# Patient Record
Sex: FEMALE | Race: WHITE | NOT HISPANIC OR LATINO | Employment: OTHER | URBAN - METROPOLITAN AREA
[De-identification: names, ages, dates, MRNs, and addresses within clinical notes are randomized per-mention and may not be internally consistent; named-entity substitution may affect disease eponyms.]

---

## 2023-11-08 ENCOUNTER — APPOINTMENT (OUTPATIENT)
Dept: ADMISSIONS | Facility: MEDICAL CENTER | Age: 73
End: 2023-11-08
Attending: SURGERY
Payer: MEDICARE

## 2023-11-16 ENCOUNTER — PRE-ADMISSION TESTING (OUTPATIENT)
Dept: ADMISSIONS | Facility: MEDICAL CENTER | Age: 73
End: 2023-11-16
Attending: SURGERY
Payer: MEDICARE

## 2023-11-16 RX ORDER — FAMOTIDINE 20 MG/1
20 TABLET, FILM COATED ORAL EVERY MORNING
COMMUNITY

## 2023-11-16 RX ORDER — UBIDECARENONE 75 MG
100 CAPSULE ORAL DAILY
COMMUNITY

## 2023-11-16 RX ORDER — ASPIRIN 81 MG/1
81 TABLET ORAL DAILY
COMMUNITY

## 2023-11-16 RX ORDER — CHLORAL HYDRATE 500 MG
1000 CAPSULE ORAL
COMMUNITY

## 2023-11-16 RX ORDER — MAGNESIUM OXIDE 400 MG/1
400 TABLET ORAL DAILY
COMMUNITY

## 2023-11-16 RX ORDER — VITAMIN E 268 MG
400 CAPSULE ORAL DAILY
COMMUNITY

## 2023-11-17 ENCOUNTER — PRE-ADMISSION TESTING (OUTPATIENT)
Dept: ADMISSIONS | Facility: MEDICAL CENTER | Age: 73
End: 2023-11-17
Attending: SURGERY
Payer: MEDICARE

## 2023-11-17 DIAGNOSIS — Z01.810 PRE-OPERATIVE CARDIOVASCULAR EXAMINATION: ICD-10-CM

## 2023-11-17 DIAGNOSIS — Z01.812 PRE-OPERATIVE LABORATORY EXAMINATION: ICD-10-CM

## 2023-11-17 LAB
ANION GAP SERPL CALC-SCNC: 8 MMOL/L (ref 7–16)
BUN SERPL-MCNC: 17 MG/DL (ref 8–22)
CALCIUM SERPL-MCNC: 10.7 MG/DL (ref 8.5–10.5)
CHLORIDE SERPL-SCNC: 100 MMOL/L (ref 96–112)
CO2 SERPL-SCNC: 25 MMOL/L (ref 20–33)
CREAT SERPL-MCNC: 0.7 MG/DL (ref 0.5–1.4)
EKG IMPRESSION: NORMAL
ERYTHROCYTE [DISTWIDTH] IN BLOOD BY AUTOMATED COUNT: 41.3 FL (ref 35.9–50)
GFR SERPLBLD CREATININE-BSD FMLA CKD-EPI: 91 ML/MIN/1.73 M 2
GLUCOSE SERPL-MCNC: 92 MG/DL (ref 65–99)
HCT VFR BLD AUTO: 44 % (ref 37–47)
HGB BLD-MCNC: 14.6 G/DL (ref 12–16)
MCH RBC QN AUTO: 30 PG (ref 27–33)
MCHC RBC AUTO-ENTMCNC: 33.2 G/DL (ref 32.2–35.5)
MCV RBC AUTO: 90.3 FL (ref 81.4–97.8)
PLATELET # BLD AUTO: 245 K/UL (ref 164–446)
PMV BLD AUTO: 11.4 FL (ref 9–12.9)
POTASSIUM SERPL-SCNC: 4.6 MMOL/L (ref 3.6–5.5)
RBC # BLD AUTO: 4.87 M/UL (ref 4.2–5.4)
SODIUM SERPL-SCNC: 133 MMOL/L (ref 135–145)
WBC # BLD AUTO: 7.3 K/UL (ref 4.8–10.8)

## 2023-11-17 PROCEDURE — 85027 COMPLETE CBC AUTOMATED: CPT

## 2023-11-17 PROCEDURE — 36415 COLL VENOUS BLD VENIPUNCTURE: CPT

## 2023-11-17 PROCEDURE — 93010 ELECTROCARDIOGRAM REPORT: CPT | Performed by: INTERNAL MEDICINE

## 2023-11-17 PROCEDURE — 93005 ELECTROCARDIOGRAM TRACING: CPT

## 2023-11-17 PROCEDURE — 80048 BASIC METABOLIC PNL TOTAL CA: CPT

## 2023-12-13 ENCOUNTER — ANESTHESIA (OUTPATIENT)
Dept: SURGERY | Facility: MEDICAL CENTER | Age: 73
End: 2023-12-13
Payer: MEDICARE

## 2023-12-13 ENCOUNTER — HOSPITAL ENCOUNTER (OUTPATIENT)
Facility: MEDICAL CENTER | Age: 73
End: 2023-12-13
Attending: SURGERY | Admitting: SURGERY
Payer: MEDICARE

## 2023-12-13 ENCOUNTER — ANESTHESIA EVENT (OUTPATIENT)
Dept: SURGERY | Facility: MEDICAL CENTER | Age: 73
End: 2023-12-13
Payer: MEDICARE

## 2023-12-13 VITALS
BODY MASS INDEX: 22.13 KG/M2 | HEART RATE: 70 BPM | OXYGEN SATURATION: 94 % | WEIGHT: 129.63 LBS | TEMPERATURE: 97.3 F | RESPIRATION RATE: 17 BRPM | HEIGHT: 64 IN | DIASTOLIC BLOOD PRESSURE: 71 MMHG | SYSTOLIC BLOOD PRESSURE: 161 MMHG

## 2023-12-13 DIAGNOSIS — G89.18 POSTOPERATIVE PAIN: ICD-10-CM

## 2023-12-13 LAB
COLLECT TME BLD: 1123 HH:MM
COLLECT TME BLD: 1134 HH:MM
COLLECT TME BLD: 1143 HH:MM
COLLECT TME BLD: 1148 HH:MM
COLLECT TME BLD: 1153 HH:MM
PATHOLOGY CONSULT NOTE: NORMAL
PTH-INTACT IO % DIF SERPL: 67 %
PTH-INTACT IO % DIF SERPL: 77 %
PTH-INTACT IO % DIF SERPL: 86 %
PTH-INTACT SP1 SERPL-MCNC: 103 PG/ML (ref 14–72)
PTH-INTACT SP2 SERPL-MCNC: 138 PG/ML
PTH-INTACT SP3 SERPL-MCNC: 45.1 PG/ML
PTH-INTACT SP4 SERPL-MCNC: 31.2 PG/ML
PTH-INTACT SP5 SERPL-MCNC: 19.9 PG/ML

## 2023-12-13 PROCEDURE — 160048 HCHG OR STATISTICAL LEVEL 1-5: Performed by: SURGERY

## 2023-12-13 PROCEDURE — A9270 NON-COVERED ITEM OR SERVICE: HCPCS

## 2023-12-13 PROCEDURE — 160029 HCHG SURGERY MINUTES - 1ST 30 MINS LEVEL 4: Performed by: SURGERY

## 2023-12-13 PROCEDURE — A9270 NON-COVERED ITEM OR SERVICE: HCPCS | Performed by: ANESTHESIOLOGY

## 2023-12-13 PROCEDURE — C1751 CATH, INF, PER/CENT/MIDLINE: HCPCS | Performed by: SURGERY

## 2023-12-13 PROCEDURE — 700102 HCHG RX REV CODE 250 W/ 637 OVERRIDE(OP): Performed by: ANESTHESIOLOGY

## 2023-12-13 PROCEDURE — 700102 HCHG RX REV CODE 250 W/ 637 OVERRIDE(OP)

## 2023-12-13 PROCEDURE — 700105 HCHG RX REV CODE 258: Performed by: SURGERY

## 2023-12-13 PROCEDURE — 160041 HCHG SURGERY MINUTES - EA ADDL 1 MIN LEVEL 4: Performed by: SURGERY

## 2023-12-13 PROCEDURE — 88331 PATH CONSLTJ SURG 1 BLK 1SPC: CPT

## 2023-12-13 PROCEDURE — 700101 HCHG RX REV CODE 250: Performed by: ANESTHESIOLOGY

## 2023-12-13 PROCEDURE — 700111 HCHG RX REV CODE 636 W/ 250 OVERRIDE (IP): Performed by: ANESTHESIOLOGY

## 2023-12-13 PROCEDURE — 160036 HCHG PACU - EA ADDL 30 MINS PHASE I: Performed by: SURGERY

## 2023-12-13 PROCEDURE — 700111 HCHG RX REV CODE 636 W/ 250 OVERRIDE (IP): Mod: JZ | Performed by: ANESTHESIOLOGY

## 2023-12-13 PROCEDURE — 83970 ASSAY OF PARATHORMONE: CPT | Mod: 91

## 2023-12-13 PROCEDURE — 160009 HCHG ANES TIME/MIN: Performed by: SURGERY

## 2023-12-13 PROCEDURE — 160002 HCHG RECOVERY MINUTES (STAT): Performed by: SURGERY

## 2023-12-13 PROCEDURE — 160035 HCHG PACU - 1ST 60 MINS PHASE I: Performed by: SURGERY

## 2023-12-13 PROCEDURE — 160046 HCHG PACU - 1ST 60 MINS PHASE II: Performed by: SURGERY

## 2023-12-13 PROCEDURE — 160025 RECOVERY II MINUTES (STATS): Performed by: SURGERY

## 2023-12-13 PROCEDURE — 88305 TISSUE EXAM BY PATHOLOGIST: CPT

## 2023-12-13 RX ORDER — HYDROMORPHONE HYDROCHLORIDE 1 MG/ML
0.2 INJECTION, SOLUTION INTRAMUSCULAR; INTRAVENOUS; SUBCUTANEOUS
Status: DISCONTINUED | OUTPATIENT
Start: 2023-12-13 | End: 2023-12-13 | Stop reason: HOSPADM

## 2023-12-13 RX ORDER — OXYCODONE HCL 5 MG/5 ML
10 SOLUTION, ORAL ORAL
Status: COMPLETED | OUTPATIENT
Start: 2023-12-13 | End: 2023-12-13

## 2023-12-13 RX ORDER — HYDROCODONE BITARTRATE AND ACETAMINOPHEN 5; 325 MG/1; MG/1
1-2 TABLET ORAL EVERY 6 HOURS PRN
Qty: 30 TABLET | Refills: 0 | Status: SHIPPED | OUTPATIENT
Start: 2023-12-13 | End: 2023-12-20

## 2023-12-13 RX ORDER — ONDANSETRON 2 MG/ML
4 INJECTION INTRAMUSCULAR; INTRAVENOUS
Status: DISCONTINUED | OUTPATIENT
Start: 2023-12-13 | End: 2023-12-13 | Stop reason: HOSPADM

## 2023-12-13 RX ORDER — SODIUM CHLORIDE, SODIUM LACTATE, POTASSIUM CHLORIDE, CALCIUM CHLORIDE 600; 310; 30; 20 MG/100ML; MG/100ML; MG/100ML; MG/100ML
INJECTION, SOLUTION INTRAVENOUS CONTINUOUS
Status: DISCONTINUED | OUTPATIENT
Start: 2023-12-13 | End: 2023-12-13 | Stop reason: HOSPADM

## 2023-12-13 RX ORDER — DEXAMETHASONE SODIUM PHOSPHATE 4 MG/ML
INJECTION, SOLUTION INTRA-ARTICULAR; INTRALESIONAL; INTRAMUSCULAR; INTRAVENOUS; SOFT TISSUE PRN
Status: DISCONTINUED | OUTPATIENT
Start: 2023-12-13 | End: 2023-12-13 | Stop reason: SURG

## 2023-12-13 RX ORDER — ACETAMINOPHEN 325 MG/1
650 TABLET ORAL ONCE
Status: DISCONTINUED | OUTPATIENT
Start: 2023-12-13 | End: 2023-12-13 | Stop reason: HOSPADM

## 2023-12-13 RX ORDER — LABETALOL HYDROCHLORIDE 5 MG/ML
5 INJECTION, SOLUTION INTRAVENOUS
Status: DISCONTINUED | OUTPATIENT
Start: 2023-12-13 | End: 2023-12-13 | Stop reason: HOSPADM

## 2023-12-13 RX ORDER — ACETAMINOPHEN 325 MG/1
TABLET ORAL
Status: COMPLETED
Start: 2023-12-13 | End: 2023-12-13

## 2023-12-13 RX ORDER — EPHEDRINE SULFATE 50 MG/ML
5 INJECTION, SOLUTION INTRAVENOUS
Status: DISCONTINUED | OUTPATIENT
Start: 2023-12-13 | End: 2023-12-13 | Stop reason: HOSPADM

## 2023-12-13 RX ORDER — ONDANSETRON 2 MG/ML
INJECTION INTRAMUSCULAR; INTRAVENOUS PRN
Status: DISCONTINUED | OUTPATIENT
Start: 2023-12-13 | End: 2023-12-13 | Stop reason: SURG

## 2023-12-13 RX ORDER — EPHEDRINE SULFATE 50 MG/ML
INJECTION, SOLUTION INTRAVENOUS PRN
Status: DISCONTINUED | OUTPATIENT
Start: 2023-12-13 | End: 2023-12-13 | Stop reason: SURG

## 2023-12-13 RX ORDER — REMIFENTANIL HYDROCHLORIDE 1 MG/ML
INJECTION, POWDER, LYOPHILIZED, FOR SOLUTION INTRAVENOUS
Status: DISCONTINUED | OUTPATIENT
Start: 2023-12-13 | End: 2023-12-13 | Stop reason: SURG

## 2023-12-13 RX ORDER — DIPHENHYDRAMINE HYDROCHLORIDE 50 MG/ML
12.5 INJECTION INTRAMUSCULAR; INTRAVENOUS
Status: DISCONTINUED | OUTPATIENT
Start: 2023-12-13 | End: 2023-12-13 | Stop reason: HOSPADM

## 2023-12-13 RX ORDER — CALCIUM CARBONATE 1000 MG/1
2000 TABLET, CHEWABLE ORAL EVERY MORNING
Qty: 90 TABLET | Refills: 3 | Status: SHIPPED | OUTPATIENT
Start: 2023-12-13

## 2023-12-13 RX ORDER — OXYCODONE HCL 5 MG/5 ML
5 SOLUTION, ORAL ORAL
Status: COMPLETED | OUTPATIENT
Start: 2023-12-13 | End: 2023-12-13

## 2023-12-13 RX ORDER — HYDRALAZINE HYDROCHLORIDE 20 MG/ML
5 INJECTION INTRAMUSCULAR; INTRAVENOUS
Status: DISCONTINUED | OUTPATIENT
Start: 2023-12-13 | End: 2023-12-13 | Stop reason: HOSPADM

## 2023-12-13 RX ORDER — HYDROMORPHONE HYDROCHLORIDE 1 MG/ML
0.1 INJECTION, SOLUTION INTRAMUSCULAR; INTRAVENOUS; SUBCUTANEOUS
Status: DISCONTINUED | OUTPATIENT
Start: 2023-12-13 | End: 2023-12-13 | Stop reason: HOSPADM

## 2023-12-13 RX ORDER — HALOPERIDOL 5 MG/ML
1 INJECTION INTRAMUSCULAR
Status: DISCONTINUED | OUTPATIENT
Start: 2023-12-13 | End: 2023-12-13 | Stop reason: HOSPADM

## 2023-12-13 RX ORDER — SODIUM CHLORIDE, SODIUM LACTATE, POTASSIUM CHLORIDE, CALCIUM CHLORIDE 600; 310; 30; 20 MG/100ML; MG/100ML; MG/100ML; MG/100ML
INJECTION, SOLUTION INTRAVENOUS CONTINUOUS
Status: ACTIVE | OUTPATIENT
Start: 2023-12-13 | End: 2023-12-13

## 2023-12-13 RX ORDER — HYDROMORPHONE HYDROCHLORIDE 1 MG/ML
0.4 INJECTION, SOLUTION INTRAMUSCULAR; INTRAVENOUS; SUBCUTANEOUS
Status: DISCONTINUED | OUTPATIENT
Start: 2023-12-13 | End: 2023-12-13 | Stop reason: HOSPADM

## 2023-12-13 RX ORDER — LIDOCAINE HYDROCHLORIDE 20 MG/ML
INJECTION, SOLUTION EPIDURAL; INFILTRATION; INTRACAUDAL; PERINEURAL PRN
Status: DISCONTINUED | OUTPATIENT
Start: 2023-12-13 | End: 2023-12-14 | Stop reason: HOSPADM

## 2023-12-13 RX ADMIN — ACETAMINOPHEN 650 MG: 325 TABLET, FILM COATED ORAL at 12:39

## 2023-12-13 RX ADMIN — EPHEDRINE SULFATE 5 MG: 50 INJECTION, SOLUTION INTRAVENOUS at 11:45

## 2023-12-13 RX ADMIN — LABETALOL HYDROCHLORIDE 5 MG: 5 INJECTION, SOLUTION INTRAVENOUS at 12:42

## 2023-12-13 RX ADMIN — PROPOFOL 50 MG: 10 INJECTION, EMULSION INTRAVENOUS at 12:09

## 2023-12-13 RX ADMIN — DEXAMETHASONE SODIUM PHOSPHATE 8 MG: 4 INJECTION INTRA-ARTICULAR; INTRALESIONAL; INTRAMUSCULAR; INTRAVENOUS; SOFT TISSUE at 11:28

## 2023-12-13 RX ADMIN — ONDANSETRON 4 MG: 2 INJECTION INTRAMUSCULAR; INTRAVENOUS at 11:46

## 2023-12-13 RX ADMIN — PROPOFOL 150 MG: 10 INJECTION, EMULSION INTRAVENOUS at 11:12

## 2023-12-13 RX ADMIN — FENTANYL CITRATE 50 MCG: 50 INJECTION, SOLUTION INTRAMUSCULAR; INTRAVENOUS at 12:12

## 2023-12-13 RX ADMIN — SODIUM CHLORIDE, POTASSIUM CHLORIDE, SODIUM LACTATE AND CALCIUM CHLORIDE: 600; 310; 30; 20 INJECTION, SOLUTION INTRAVENOUS at 11:08

## 2023-12-13 RX ADMIN — Medication 80 MG: at 11:12

## 2023-12-13 RX ADMIN — ACETAMINOPHEN 650 MG: 325 TABLET ORAL at 12:39

## 2023-12-13 RX ADMIN — HYDRALAZINE HYDROCHLORIDE 5 MG: 20 INJECTION, SOLUTION INTRAMUSCULAR; INTRAVENOUS at 13:44

## 2023-12-13 RX ADMIN — FENTANYL CITRATE 50 MCG: 50 INJECTION, SOLUTION INTRAMUSCULAR; INTRAVENOUS at 11:10

## 2023-12-13 RX ADMIN — FENTANYL CITRATE 25 MCG: 50 INJECTION, SOLUTION INTRAMUSCULAR; INTRAVENOUS at 13:00

## 2023-12-13 RX ADMIN — EPHEDRINE SULFATE 10 MG: 50 INJECTION, SOLUTION INTRAVENOUS at 11:26

## 2023-12-13 RX ADMIN — FENTANYL CITRATE 25 MCG: 50 INJECTION, SOLUTION INTRAMUSCULAR; INTRAVENOUS at 13:07

## 2023-12-13 RX ADMIN — LIDOCAINE HYDROCHLORIDE 100 MG: 20 INJECTION, SOLUTION EPIDURAL; INFILTRATION; INTRACAUDAL at 11:12

## 2023-12-13 RX ADMIN — OXYCODONE HYDROCHLORIDE 5 MG: 5 SOLUTION ORAL at 12:40

## 2023-12-13 RX ADMIN — REMIFENTANIL HYDROCHLORIDE 0.1 MCG/KG/MIN: 1 INJECTION, POWDER, LYOPHILIZED, FOR SOLUTION INTRAVENOUS at 11:16

## 2023-12-13 RX ADMIN — HYDRALAZINE HYDROCHLORIDE 5 MG: 20 INJECTION, SOLUTION INTRAMUSCULAR; INTRAVENOUS at 13:31

## 2023-12-13 ASSESSMENT — PAIN DESCRIPTION - PAIN TYPE
TYPE: SURGICAL PAIN

## 2023-12-13 NOTE — ANESTHESIA PROCEDURE NOTES
Airway    Date/Time: 12/13/2023 11:14 AM    Performed by: Shimon Nuñez M.D.  Authorized by: Shimon Nuñez M.D.    Location:  OR  Urgency:  Elective  Difficult Airway: No    Indications for Airway Management:  Anesthesia      Spontaneous Ventilation: absent    Sedation Level:  Deep  Preoxygenated: Yes    Patient Position:  Sniffing  Mask Difficulty Assessment:  1 - vent by mask  Final Airway Type:  Endotracheal airway  Final Endotracheal Airway:  ETT and NIM tube  Cuffed: Yes    Technique Used for Successful ETT Placement:  Direct laryngoscopy    Insertion Site:  Oral  Blade Type:  Oh  Laryngoscope Blade/Videolaryngoscope Blade Size:  3  ETT Size (mm):  7.0  Measured from:  Teeth  ETT to Teeth (cm):  22  Placement Verified by: auscultation and capnometry    Cormack-Lehane Classification:  Grade IIa - partial view of glottis  Number of Attempts at Approach:  1

## 2023-12-13 NOTE — DISCHARGE INSTRUCTIONS
What to Expect Post Anesthesia    Rest and take it easy for the first 24 hours.  A responsible adult is recommended to remain with you during that time.  It is normal to feel sleepy.  We encourage you to not do anything that requires balance, judgment or coordination.    FOR 24 HOURS DO NOT:  Drive, operate machinery or run household appliances.  Drink beer or alcoholic beverages.  Make important decisions or sign legal documents.    To avoid nausea, slowly advance diet as tolerated, avoiding spicy or greasy foods for the first day.  Add more substantial food to your diet according to your provider's instructions.  INCREASE FLUIDS AND FIBER TO AVOID CONSTIPATION.    MILD FLU-LIKE SYMPTOMS ARE NORMAL.  YOU MAY EXPERIENCE GENERALIZED MUSCLE ACHES, THROAT IRRITATION, HEADACHE AND/OR SOME NAUSEA.    If any questions arise, call your provider.   If your provider is not available, please feel free to call the Surgical Center at [phone].    MEDICATIONS: Resume taking daily medication.  Take prescribed pain medication with food.  If no medication is prescribed, you may take non-aspirin pain medication if needed.  PAIN MEDICATION CAN BE VERY CONSTIPATING.  Take a stool softener or laxative such as senokot, pericolace, or milk of magnesia if needed.    Last pain medication given at 1pm (oral oxycodone given). Ok to take your prescription pain medication if needed after 7pm    Tylenol given at 1pm.    Ok to take ibuprofen/Motrin if needed at any time.      Post-Operative Discharge Instructions for Parathyroidectomy      ACTIVITY:    Most patients are able to return to a full-time work schedule in 1-2 weeks; however this may vary according to your job. It may take longer to return to heavy physical or other demanding work, or shorter if you are feeling well.      Do NOT drive a car until you are able to turn the neck side to side, which may take 1-2 weeks.       Do NOT drive while you are taking pain medicines.       DIET:    You may have temporary throat discomfort or difficulty swallowing. This is due to the surgery around your larynx (voice box) and esophagus (swallowing tube). These symptoms will gradually improve over the course of several weeks.       Drink and eat foods that can be swallowed easily, e.g. juice, soup, gelatin, applesauce, scrambled eggs or mashed potatoes.       You may be able to return to your usual diet in a couple of days.      INCISION CARE:    You may remove the outer dressing 48 hours after surgery.      You may shower 48 hours after surgery but please do not swim or soak in a tub for at least 2 weeks. After you finish showering, just pat your incision dry. If it is draining clear fluid, you can cover it with a dry dressing (such as gauze). Do NOT scrub with soap or washcloth for the first 10 days.       Mild swelling at the incision site will go away in 4-6 weeks. The pink line will slowly fade to white during the next 6-12 months.       Use a sunscreen (SPF#30 or higher) or wear a scarf for protection if in the sun for the first 6 months to a year as the sun can darken your scar.       You may begin to use a hypoallergenic moisturizing cream (no vitamin E, Mederma, or other “scar” creams) along the incision after 2 weeks.      COMMON PROBLEMS:    Numbness of the skin under the chin or above the incision is normal and should go away in a few weeks.       You may feel a lump or pressure in your throat sensation when swallowing for a few weeks.       Your incision may feel itchy while it heals. Avoid rubbing or scratching if possible.       You may feel neck stiffness, tightness, a pulling feeling, mild aching chest discomfort, headache, ear pain or congestion. Take a mild pain medicine such as Tylenol or Advil. Put heat on the area using a hot water bottle, heating pad or warm shower.       Some people prefer to sleep with an extra pillow for the first few days after the surgery, this helps keep  swelling around your incision to a minimum.       Your voice may be hoarse or weak. Pitch or tone may change. You may have difficulty singing. This usually goes back to normal over 6 weeks to 6 months.       After surgery, you may notice a change in your mood, emotional ups and downs, depression, irritability or fatigue and weakness. These changes will get better as time passes.       You do not need to be at bed rest, being active is normally well tolerated within reason.      MEDICATIONS:    Take 2000 mg of TUMS over-the-counter after your surgery (two tablets). If you begin experiencing symptoms of low calcium such as numbness or tingling in your fingertips or around your mouth, you may increase the amount of TUMS you take up to 10 tablets per day. If symptoms persist on 10 TUMS a day, call my office to inform me.      Please note that it is common for the calcium level to be low following removal of the parathyroid, and you may experience numbness or tingling, which is a sign of low calcium. If this happens, it should improve when you take your calcium supplements. If it does not, please call your doctor.       Please resume your pre-hospital medications. You should follow-up with your primary care physician regarding new prescriptions and refills.       We will supply you with a prescription for a mild narcotic pain medication. You are not required to take it. If you do take it, please do not drive or drink alcohol as these in combination may make you drowsy. Most patients do not need strong pain medicine by the time you leave the hospital. You can take Tylenol (acetaminophen) or ibuprofen (e.g. Advil) as needed.       If you are taking supplemental calcium or narcotics, you may also want to take a stool softener, such as over-the-counter Colace or Senna, to avoid constipation. Stay hydrated by drinking some extra water.      LABORATORY DRAW:  You have been provided with a prescription to have you calcium  levels drawn prior to your follow up visit. The order is at the laboratory that you designated at as your lab of choice at the office. Please have this drawn ~2 days prior to your follow-up visit.     CALL YOUR DOCTOR IF:    Call your doctor or go to the Emergency Room if you have fever (temperature greater than 100.5), chills, lightheadedness, shortness of breath, difficulty breathing, nausea, vomiting, numbness or tingling in your fingers, hands, or mouth, muscle spasms, or if you notice signs of wound infection (redness, tenderness, or drainage from the incision). Please also call or go to the Emergency Room if you have any other urgent concerns.      FOLLOW-UP:   With Dr. Sanchez in one week, call to schedule, [phone].     Eleni Sanchez M.D.  [facility]     [address] [place] [address] [id number]  [place] [id number]

## 2023-12-13 NOTE — ANESTHESIA PREPROCEDURE EVALUATION
Case: [id number] Anesthesia Start Date/Time: 12/13/23 1108    Procedure: MINIMALLY INVASIVE PARATHYROID EXPLORATION WITH INTRAOPERATIVE PARA THYROID HORMONE MONITORING    Pre-op diagnosis: PRIMARY HYPERPARATHYROIDISM    Location: [facility] ROOM 23 / SURGERY SAME DAY [facility]    Surgeons: Eleni Sanchez M.D.          CAD, h/o MI and stents x 2, denies any CP/SOB, >4 METS    Relevant Problems   No relevant active problems       Physical Exam    Airway   Mallampati: II  TM distance: >3 FB  Neck ROM: full       Cardiovascular - normal exam  Rhythm: regular  Rate: normal  (-) murmur     Dental - normal exam           Pulmonary - normal exam  Breath sounds clear to auscultation     Abdominal    Neurological - normal exam                   Anesthesia Plan    ASA 3   ASA physical status 3 criteria: CAD/stents (> 3 months)    Plan - general       Airway plan will be ETT          Induction: intravenous    Postoperative Plan: Postoperative administration of opioids is intended.    Pertinent diagnostic labs and testing reviewed    Informed Consent:    Anesthetic plan and risks discussed with patient.    Use of blood products discussed with: patient whom consented to blood products.

## 2023-12-13 NOTE — OP REPORT
Operative Report     Date: 12/13/2023    Surgeon: Eleni Sanchez M.D.     Assistant: Heron Baumgarten M.D.    My assistant was medically necessary for this procedure. She placed the precordial electrodes were placed by the surgical assistant, who then monitored the recurrent laryngeal nerve throughout the procedure, as well as retracted the tissues to aid in my visualization of the parathyroid tissue, and assisted with wound closure.    Anesthesiologist: Joaquin SR    Pre-operative Diagnosis: E 21.0 primary hyperparathyroidism     Post-operative Diagnosis: same, right inferior parathyroid adenoma     Procedure:   1. right minimally invasive parathyroid exploration with intraoperative PTH monitoring (55247 Parathyroidectomy or exploration of parathyroid)  2. unilateral recurrent laryngeal nerve monitoring     Findings: Enlarged right inferior parathyroid adenoma. Intraoperative PTH monitoring was performed with levels drawn at appropriate intervals. There was a 86% drop from the highest level, with the final level being 19 picograms/deciliter.     Procedure in detail: The patient was identified in the pre-operative holding area and brought to the operating room. Correct side and site were identified. GETA was smoothly induced. The patient was prepped and draped in the usual sterile fashion.     With the patient in the supine position, the head of the bed slightly elevated, the neck slightly extended, and the patient intubated with a NIM endotracheal tube, the precordial electrodes were placed by the surgical assistant, who then monitored the recurrent laryngeal nerve throughout the procedure.     The neck was prepared with betadiene and draped in the usual fashion. The neck was entered through a short lower transverse cervical incision and carried down through the platysma. Subplatysmal flaps were dissected superiorly and inferiorly down to the sternal notch. The midline cervical fascia was incised down to the  capsule of the thyroid.     The strap muscles were mobilized off the right thyroid lobe, and with careful dissection we identified an enlarged appearing right inferior gland. The vascular pedicles were divided with the Ligasure device. The gland was sent for immediate pathologic exam, which revealed a hypercellular enlarged parathyroid gland.     Intraoperative PTH monitoring was performed with levels drawn at appropriate intervals. There was a 86% drop from the highest level, with the final level being 19 picograms/deciliter.     The integrity of the right laryngeal nerve was documented. Fibrillar was placed in the right side of the neck. The midline cervical fascia was reapproximated with running 3-0 Vicryl. Platysma was reapproximated with interrupted 3-0 Vicryl. The skin was closed with running monocryl.     The patient was awakened from general anesthetic, and was taken to the recovery room in stable condition.     Sponge and needle counts were correct at the end of the case.     Specimen: right inferior parathyroid adenoma     EBL: minimal     Dispo: stable, extubated, to PACU     Eleni Sanchez M.D.   [facility]   [phone]

## 2023-12-13 NOTE — PROGRESS NOTES
Post-Operative Discharge Instructions for Parathyroidectomy     ACTIVITY:    Most patients are able to return to a full-time work schedule in 1-2 weeks; however this may vary according to your job. It may take longer to return to heavy physical or other demanding work, or shorter if you are feeling well.     Do NOT drive a car until you are able to turn the neck side to side, which may take 1-2 weeks.      Do NOT drive while you are taking pain medicines.     DIET:    You may have temporary throat discomfort or difficulty swallowing. This is due to the surgery around your larynx (voice box) and esophagus (swallowing tube). These symptoms will gradually improve over the course of several weeks.      Drink and eat foods that can be swallowed easily, e.g. juice, soup, gelatin, applesauce, scrambled eggs or mashed potatoes.      You may be able to return to your usual diet in a couple of days.     INCISION CARE:    You may remove the outer dressing 48 hours after surgery.     You may shower 48 hours after surgery but please do not swim or soak in a tub for at least 2 weeks. After you finish showering, just pat your incision dry. If it is draining clear fluid, you can cover it with a dry dressing (such as gauze). Do NOT scrub with soap or washcloth for the first 10 days.      Mild swelling at the incision site will go away in 4-6 weeks. The pink line will slowly fade to white during the next 6-12 months.      Use a sunscreen (SPF#30 or higher) or wear a scarf for protection if in the sun for the first 6 months to a year as the sun can darken your scar.      You may begin to use a hypoallergenic moisturizing cream (no vitamin E, Mederma, or other “scar” creams) along the incision after 2 weeks.     COMMON PROBLEMS:    Numbness of the skin under the chin or above the incision is normal and should go away in a few weeks.      You may feel a lump or pressure in your throat sensation when swallowing for a few weeks.      Your  incision may feel itchy while it heals. Avoid rubbing or scratching if possible.      You may feel neck stiffness, tightness, a pulling feeling, mild aching chest discomfort, headache, ear pain or congestion. Take a mild pain medicine such as Tylenol or Advil. Put heat on the area using a hot water bottle, heating pad or warm shower.      Some people prefer to sleep with an extra pillow for the first few days after the surgery, this helps keep swelling around your incision to a minimum.      Your voice may be hoarse or weak. Pitch or tone may change. You may have difficulty singing. This usually goes back to normal over 6 weeks to 6 months.      After surgery, you may notice a change in your mood, emotional ups and downs, depression, irritability or fatigue and weakness. These changes will get better as time passes.      You do not need to be at bed rest, being active is normally well tolerated within reason.     MEDICATIONS:    Take 2000 mg of TUMS over-the-counter after your surgery (two tablets). If you begin experiencing symptoms of low calcium such as numbness or tingling in your fingertips or around your mouth, you may increase the amount of TUMS you take up to 10 tablets per day. If symptoms persist on 10 TUMS a day, call my office to inform me.     Please note that it is common for the calcium level to be low following removal of the parathyroid, and you may experience numbness or tingling, which is a sign of low calcium. If this happens, it should improve when you take your calcium supplements. If it does not, please call your doctor.      Please resume your pre-hospital medications. You should follow-up with your primary care physician regarding new prescriptions and refills.      We will supply you with a prescription for a mild narcotic pain medication. You are not required to take it. If you do take it, please do not drive or drink alcohol as these in combination may make you drowsy. Most patients do  not need strong pain medicine by the time you leave the hospital. You can take Tylenol (acetaminophen) or ibuprofen (e.g. Advil) as needed.      If you are taking supplemental calcium or narcotics, you may also want to take a stool softener, such as over-the-counter Colace or Senna, to avoid constipation. Stay hydrated by drinking some extra water.     LABORATORY DRAW:  You have been provided with a prescription to have you calcium levels drawn prior to your follow up visit. The order is at the laboratory that you designated at as your lab of choice at the office. Please have this drawn ~2 days prior to your follow-up visit.    CALL YOUR DOCTOR IF:    Call your doctor or go to the Emergency Room if you have fever (temperature greater than 100.5), chills, lightheadedness, shortness of breath, difficulty breathing, nausea, vomiting, numbness or tingling in your fingers, hands, or mouth, muscle spasms, or if you notice signs of wound infection (redness, tenderness, or drainage from the incision). Please also call or go to the Emergency Room if you have any other urgent concerns.     FOLLOW-UP:   With Dr. Sanchez in one week, call to schedule, [phone].    Eleni Sanchez M.D.  [facility]    [address]  [place] [address]

## 2023-12-13 NOTE — OR NURSING
1215- Pt to PACU from OR. Report from anesthesia and OR RN. On 6L O2 via mask. Respirations even and unlabored. VSS. Dressing to anterior neck CDI. Soft to palpation    1235- Pt tolerating sips of water    1240- Pt medicated per MAR for pain and elevated BP    1300- Pt medicated per MAR for pain    1307- PT medicated per MAR for pain    1331- PT medicated per MAR for elevated BP    1344- Pt medicated for elevated BP    1400- Phase II criteria met    1420- Discharge instructions discussed with pt's . All questions answered. Verbalized understanding.    1430- Art line removed per protocol. Pt ambulated to bathroom. Able to void without difficulty. Dressed independently.    1445- BP WDL. PIV removed with tip intact. Pt escorted out of department in wheelchair with all belongings. Discharged home to responsible adult.

## 2023-12-13 NOTE — H&P
"Surgical History and Physical    Date: 2023    PCP: Domonique Harden D.O.  Attending Physician: Eleni Sanchez M.D. [facility]    CC: \"here for surgery\"    HPI: This is a 73 y.o. female who is presenting with primary hyperparathyroidism, here for surgery.     Past Medical History:   Diagnosis Date    Anesthesia     Intraoperative Awareness, and PONV    Awareness under anesthesia     Cancer (HCC)     breast ca    Cataract     mild    Heart burn     High cholesterol     Hypertension     Myocardial infarct (HCC)     PONV (postoperative nausea and vomiting)        Past Surgical History:   Procedure Laterality Date    BREAST RECONSTRUCTION Bilateral     implants    CARDIAC CATH, RIGHT/LEFT HEART      with stent x2    COLON RESECTION      FOREIGN BODY REMOVAL      R foot    LITHOTRIPSY Right     LYMPHADENECTOMY Left     underarm    MASTECTOMY Bilateral     RECTOVAGINAL FISTULA REPAIR         Current Facility-Administered Medications   Medication Dose Route Frequency Provider Last Rate Last Admin    lactated ringers infusion   Intravenous Continuous Eleni Sanchez M.D.        acetaminophen (Tylenol) tablet 650 mg  650 mg Oral Once Shimon Nuñez M.D.           Social History     Socioeconomic History    Marital status:      Spouse name: Not on file    Number of children: Not on file    Years of education: Not on file    Highest education level: Not on file   Occupational History    Not on file   Tobacco Use    Smoking status: Former     Current packs/day: 0.00     Types: Cigarettes     Quit date: 1973     Years since quittin.1    Smokeless tobacco: Never   Vaping Use    Vaping Use: Never used   Substance and Sexual Activity    Alcohol use: Yes     Alcohol/week: 1.2 oz     Types: 2 Cans of beer per week     Comment: 4x/week    Drug use: Never    Sexual activity: Not on file   Other Topics Concern    Not on file   Social History Narrative    Not on file     Social Determinants of " "Health     Financial Resource Strain: Not on file   Food Insecurity: Not on file   Transportation Needs: Not on file   Physical Activity: Not on file   Stress: Not on file   Social Connections: Not on file   Intimate Partner Violence: Not on file   Housing Stability: Not on file       History reviewed. No pertinent family history.    Allergies:  Morphine    Review of Systems:  Constitutional: Negative for fever, chills, weight loss, malaise/fatigue and diaphoresis.   HENT: Negative for hearing loss, ear pain, nosebleeds, congestion, sore throat, neck pain, tinnitus and ear discharge.    Eyes: Negative for blurred vision, double vision, photophobia, pain, discharge and redness.   Respiratory: Negative for cough, hemoptysis, sputum production, shortness of breath, wheezing and stridor.    Cardiovascular: Negative for chest pain, palpitations, orthopnea, claudication, leg swelling and PND.   Gastrointestinal: Negative for heartburn, nausea, vomiting, abdominal pain, diarrhea, constipation, blood in stool and melena.   Genitourinary: Negative for dysuria, urgency, frequency, hematuria and flank pain.   Musculoskeletal: Negative for myalgias, back pain, joint pain and falls.   Skin: Negative for itching and rash.  Neurological: Negative for dizziness, tingling, tremors, sensory change, speech change, focal weakness, seizures, loss of consciousness, weakness and headaches.   Endo/Heme/Allergies: Negative for environmental allergies and polydipsia. Does not bruise/bleed easily.   Psychiatric/Behavioral: Negative for depression, suicidal ideas, hallucinations, memory loss and substance abuse. The patient is not nervous/anxious and does not have insomnia.    Physical Exam:  BP (!) 158/68   Pulse 60   Temp 36.6 °C (97.8 °F) (Temporal)   Resp 17   Ht 1.626 m (5' 4\")   Wt 58.8 kg (129 lb 10.1 oz)   SpO2 96%     Constitutional: she is oriented to person, place, and time.  she appears well-developed and well-nourished. No " distress.   Head: Normocephalic and atraumatic.   Neck: Normal range of motion. Neck supple. No JVD present. No tracheal deviation present. No thyromegaly present.   Cardiovascular: Normal rate, regular rhythm, normal heart sounds and intact distal pulses.  Exam reveals no gallop and no friction rub.  No murmur heard.  Pulmonary/Chest: Effort normal and breath sounds normal. No stridor. No respiratory distress. she has no wheezes. she has no rales.   Abdominal: Soft. Bowel sounds are normal. she exhibits no distension and no mass. There is no tenderness. There is no rebound and no guarding.   Musculoskeletal: Normal range of motion. she exhibits no edema and no tenderness.   Neurological: she is alert and oriented to person, place, and time. she has normal reflexes. No cranial nerve deficit. Coordination normal.   Skin: Skin is warm and dry. No rash noted. she is not diaphoretic. No erythema. No pallor.   Psychiatric: she has a normal mood and affect.  Behavior is normal.         Assessment: This is a 73 y.o. female with primary hyperparathyroidism.    Plan: Parathyroidectomy.     Eleni Sanchez M.D.  [facility]  [phone]

## 2023-12-13 NOTE — ANESTHESIA TIME REPORT
Anesthesia Start and Stop Event Times       Date Time Event    12/13/2023 1100 Ready for Procedure     1108 Anesthesia Start     1217 Anesthesia Stop          Responsible Staff  12/13/23      Name Role Begin End    Shimon Nuñez M.D. Anesth 1108 1217          Overtime Reason:  no overtime (within assigned shift)    Comments:

## 2023-12-13 NOTE — ANESTHESIA PROCEDURE NOTES
Arterial Line    Performed by: Shimon Nuñez M.D.  Authorized by: Shimon Nuñez M.D.    Start Time:  12/13/2023 11:20 AM  End Time:  12/13/2023 11:23 AM  Localization: surface landmarks    Patient Location:  OR  Indication: continuous blood pressure monitoring and blood sampling needed        Catheter Size:  20 G  Seldinger Technique?: Yes    Laterality:  Right  Site:  Radial artery  Line Secured:  Antimicrobial disc, tape and transparent dressing  Events: patient tolerated procedure well with no complications

## 2023-12-14 NOTE — ANESTHESIA POSTPROCEDURE EVALUATION
Patient: Larissa Lopez    Procedure Summary       Date: 12/13/23 Room / Location: [facility] ROOM 23 / SURGERY SAME DAY [facility]    Anesthesia Start: 1108 Anesthesia Stop: 1217    Procedure: MINIMALLY INVASIVE PARATHYROID EXPLORATION WITH INTRAOPERATIVE PARA THYROID HORMONE MONITORING (Neck) Diagnosis: (PRIMARY HYPERPARATHYROIDISM)    Surgeons: Eleni Sanchez M.D. Responsible Provider: Shimon Nuñez M.D.    Anesthesia Type: general ASA Status: 3            Final Anesthesia Type: general  Last vitals  BP   Blood Pressure : (!) 161/71    Temp   36.3 °C (97.3 °F)    Pulse   70   Resp   17    SpO2   94 %      Anesthesia Post Evaluation    Patient location during evaluation: PACU  Patient participation: complete - patient participated  Level of consciousness: awake and alert    Airway patency: patent  Anesthetic complications: no  Cardiovascular status: hemodynamically stable  Respiratory status: acceptable  Hydration status: euvolemic    PONV: none          There were no known notable events for this encounter.     Nurse Pain Score: 1 (NPRS)

## 2024-09-24 ENCOUNTER — TELEPHONE (OUTPATIENT)
Dept: SURGICAL ONCOLOGY | Facility: MEDICAL CENTER | Age: 74
End: 2024-09-24
Payer: MEDICARE

## 2024-09-24 NOTE — TELEPHONE ENCOUNTER
Patient is requesting you order labs before her appointment, could you put those in or does she have to see you first?    I will send to [facility] in [place] on [address].

## (undated) DEVICE — CANISTER SUCTION 3000ML MECHANICAL FILTER AUTO SHUTOFF MEDI-VAC NONSTERILE LF DISP  (40EA/CA)

## (undated) DEVICE — KIT  I.V. START (100EA/CA)

## (undated) DEVICE — BOVIE NEEDLE TIP 3CM COLORADO

## (undated) DEVICE — SLEEVE VASO CALF MED - (10PR/CA)

## (undated) DEVICE — KIT RADIAL ARTERY 20GA W/MAX BARRIER AND BIOPATCH  (5EA/CA) #10740 IS FOR THE SET RADIAL ARTERIAL

## (undated) DEVICE — GOWN SURGEONS LARGE - (32/CA)

## (undated) DEVICE — SODIUM CHL. INJ. 0.9% 500ML (24EA/CA 50CA/PF)

## (undated) DEVICE — MASK OXYGEN VNYL ADLT MED CONC WITH 7 FOOT TUBING  - (50EA/CA)

## (undated) DEVICE — CANISTER SUCTION RIGID RED 1500CC (40EA/CA)

## (undated) DEVICE — SPONGE GAUZE STER 4X4 8-PL - (2/PK 50PK/BX 12BX/CS)

## (undated) DEVICE — SODIUM CHL IRRIGATION 0.9% 1000ML (12EA/CA)

## (undated) DEVICE — TUBING CLEARLINK DUO-VENT - C-FLO (48EA/CA)

## (undated) DEVICE — FIBRILLAR SURGICEL 4X4 - 10/CA

## (undated) DEVICE — SET LEADWIRE 5 LEAD BEDSIDE DISPOSABLE ECG (1SET OF 5/EA)

## (undated) DEVICE — SUTURE 3-0 VICRYL PLUS SH - 8X 18 INCH (12/BX)

## (undated) DEVICE — CLIP MED INTNL HRZN TI ESCP - (25/BX)

## (undated) DEVICE — TRANSDUCER ADULT DISP. SINGLE BONDED STERILE - (20EA/CA)

## (undated) DEVICE — SHEAR HS FOCUS 9CM CVD - (6/BX)

## (undated) DEVICE — DRAPE MAGNETIC (INSTRA-MAG) - (30/CA)

## (undated) DEVICE — SUTURE 5-0 MONOCRYL PLUS PC-3 - (12/BX)

## (undated) DEVICE — SENSOR OXIMETER ADULT SPO2 RD SET (20EA/BX)

## (undated) DEVICE — SPONGE PEANUT - (5/PK 50PK/CA)

## (undated) DEVICE — CANNULA O2 COMFORT SOFT EAR ADULT 7 FT TUBING (50/CA)

## (undated) DEVICE — Device

## (undated) DEVICE — PROBE PRASS STAND STIMULATING (5EA/PK)

## (undated) DEVICE — TOWEL STOP TIMEOUT SAFETY FLAG (40EA/CA)

## (undated) DEVICE — TUBE CONNECTING SUCTION - CLEAR PLASTIC STERILE 72 IN (50EA/CA)

## (undated) DEVICE — WATER IRRIGATION STERILE 1000ML (12EA/CA)

## (undated) DEVICE — GLOVE BIOGEL INDICATOR SZ 7SURGICAL PF LTX - (50/BX 4BX/CA)

## (undated) DEVICE — SUCTION INSTRUMENT YANKAUER BULBOUS TIP W/O VENT (50EA/CA)

## (undated) DEVICE — SUTURE GENERAL

## (undated) DEVICE — SHEET THYROID - (10EA/CA)

## (undated) DEVICE — DRESSING TRANSPARENT FILM TEGADERM 4 X 4.75" (50EA/BX)"

## (undated) DEVICE — GOWN WARMING STANDARD FLEX - (30/CA)

## (undated) DEVICE — LACTATED RINGERS INJ 1000 ML - (14EA/CA 60CA/PF)

## (undated) DEVICE — SUTURE 3-0 VICRYL PLUS SH - 27 INCH (36/BX)

## (undated) DEVICE — CLIP SM INTNL HRZN TI ESCP LGT - (24EA/PK 25PK/BX)

## (undated) DEVICE — TUBE EMG NIM TRIVANTAGE 7MM (3EA/PK)